# Patient Record
Sex: FEMALE | Race: WHITE | NOT HISPANIC OR LATINO | URBAN - METROPOLITAN AREA
[De-identification: names, ages, dates, MRNs, and addresses within clinical notes are randomized per-mention and may not be internally consistent; named-entity substitution may affect disease eponyms.]

---

## 2018-02-13 ENCOUNTER — APPOINTMENT (RX ONLY)
Dept: URBAN - METROPOLITAN AREA CLINIC 125 | Facility: CLINIC | Age: 73
Setting detail: DERMATOLOGY
End: 2018-02-13

## 2018-02-13 DIAGNOSIS — D22 MELANOCYTIC NEVI: ICD-10-CM

## 2018-02-13 DIAGNOSIS — D18.0 HEMANGIOMA: ICD-10-CM

## 2018-02-13 DIAGNOSIS — L82.1 OTHER SEBORRHEIC KERATOSIS: ICD-10-CM

## 2018-02-13 DIAGNOSIS — L81.4 OTHER MELANIN HYPERPIGMENTATION: ICD-10-CM

## 2018-02-13 DIAGNOSIS — B35.1 TINEA UNGUIUM: ICD-10-CM

## 2018-02-13 DIAGNOSIS — D17 BENIGN LIPOMATOUS NEOPLASM: ICD-10-CM

## 2018-02-13 PROBLEM — D18.01 HEMANGIOMA OF SKIN AND SUBCUTANEOUS TISSUE: Status: ACTIVE | Noted: 2018-02-13

## 2018-02-13 PROBLEM — D17.1 BENIGN LIPOMATOUS NEOPLASM OF SKIN AND SUBCUTANEOUS TISSUE OF TRUNK: Status: ACTIVE | Noted: 2018-02-13

## 2018-02-13 PROBLEM — F41.9 ANXIETY DISORDER, UNSPECIFIED: Status: ACTIVE | Noted: 2018-02-13

## 2018-02-13 PROBLEM — D22.5 MELANOCYTIC NEVI OF TRUNK: Status: ACTIVE | Noted: 2018-02-13

## 2018-02-13 PROBLEM — J30.1 ALLERGIC RHINITIS DUE TO POLLEN: Status: ACTIVE | Noted: 2018-02-13

## 2018-02-13 PROBLEM — F32.9 MAJOR DEPRESSIVE DISORDER, SINGLE EPISODE, UNSPECIFIED: Status: ACTIVE | Noted: 2018-02-13

## 2018-02-13 PROBLEM — I10 ESSENTIAL (PRIMARY) HYPERTENSION: Status: ACTIVE | Noted: 2018-02-13

## 2018-02-13 PROCEDURE — 99203 OFFICE O/P NEW LOW 30 MIN: CPT

## 2018-02-13 PROCEDURE — ? DIAGNOSIS COMMENT

## 2018-02-13 PROCEDURE — ? COUNSELING

## 2018-02-13 ASSESSMENT — LOCATION DETAILED DESCRIPTION DERM
LOCATION DETAILED: RIGHT SUPERIOR UPPER BACK
LOCATION DETAILED: RIGHT MEDIAL SUPERIOR CHEST
LOCATION DETAILED: RIGHT SUPERIOR MEDIAL UPPER BACK
LOCATION DETAILED: RIGHT GREAT TOENAIL
LOCATION DETAILED: RIGHT CENTRAL MALAR CHEEK
LOCATION DETAILED: RIGHT LATERAL ABDOMEN

## 2018-02-13 ASSESSMENT — LOCATION ZONE DERM
LOCATION ZONE: FACE
LOCATION ZONE: TOENAIL
LOCATION ZONE: TRUNK

## 2018-02-13 ASSESSMENT — LOCATION SIMPLE DESCRIPTION DERM
LOCATION SIMPLE: CHEST
LOCATION SIMPLE: ABDOMEN
LOCATION SIMPLE: RIGHT CHEEK
LOCATION SIMPLE: RIGHT GREAT TOE
LOCATION SIMPLE: RIGHT UPPER BACK

## 2018-05-21 NOTE — PATIENT DISCUSSION
(H25.13) Age-related nuclear cataract, bilateral - Assesment : Examination revealed cataract. - Plan : Monitor for changes. Advised patient to call our office with decreased vision or increased symptoms. Updated GLRx give today. RTC in 1 year for Exam, sooner if problems or changes.

## 2018-05-21 NOTE — PATIENT DISCUSSION
(H11.043) Peripheral pterygium, stationary, bilateral - Assesment : Examination revealed Pterygium -Nasal OU. - Plan : Monitor for changes. Advised patient of condition and handout given. Recommended ATs for comfort and wearing sunglasses. Advised Visine is more irritating and recommended Refresh or Systane. Pt to call our office with decreased vision or increased symptoms.

## 2018-10-10 ENCOUNTER — PREPPED CHART (OUTPATIENT)
Dept: URBAN - METROPOLITAN AREA CLINIC 32 | Facility: CLINIC | Age: 73
End: 2018-10-10

## 2018-10-10 ENCOUNTER — IMPORTED ENCOUNTER (OUTPATIENT)
Dept: URBAN - METROPOLITAN AREA CLINIC 43 | Facility: CLINIC | Age: 73
End: 2018-10-10

## 2018-10-10 PROBLEM — H26.491: Noted: 2018-10-10

## 2018-10-10 PROBLEM — H35.361: Noted: 2018-10-10

## 2018-10-10 PROBLEM — H25.12: Noted: 2018-10-10

## 2019-02-19 ENCOUNTER — APPOINTMENT (RX ONLY)
Dept: URBAN - METROPOLITAN AREA CLINIC 125 | Facility: CLINIC | Age: 74
Setting detail: DERMATOLOGY
End: 2019-02-19

## 2019-02-19 DIAGNOSIS — L81.4 OTHER MELANIN HYPERPIGMENTATION: ICD-10-CM

## 2019-02-19 DIAGNOSIS — D22 MELANOCYTIC NEVI: ICD-10-CM

## 2019-02-19 DIAGNOSIS — D18.0 HEMANGIOMA: ICD-10-CM

## 2019-02-19 DIAGNOSIS — L82.1 OTHER SEBORRHEIC KERATOSIS: ICD-10-CM

## 2019-02-19 PROBLEM — D18.01 HEMANGIOMA OF SKIN AND SUBCUTANEOUS TISSUE: Status: ACTIVE | Noted: 2019-02-19

## 2019-02-19 PROBLEM — D22.5 MELANOCYTIC NEVI OF TRUNK: Status: ACTIVE | Noted: 2019-02-19

## 2019-02-19 PROCEDURE — ? COUNSELING

## 2019-02-19 PROCEDURE — 99213 OFFICE O/P EST LOW 20 MIN: CPT

## 2019-02-19 ASSESSMENT — LOCATION DETAILED DESCRIPTION DERM
LOCATION DETAILED: RIGHT LATERAL ABDOMEN
LOCATION DETAILED: RIGHT MEDIAL SUPERIOR CHEST
LOCATION DETAILED: RIGHT CENTRAL MALAR CHEEK
LOCATION DETAILED: RIGHT SUPERIOR MEDIAL UPPER BACK

## 2019-02-19 ASSESSMENT — LOCATION SIMPLE DESCRIPTION DERM
LOCATION SIMPLE: RIGHT UPPER BACK
LOCATION SIMPLE: ABDOMEN
LOCATION SIMPLE: CHEST
LOCATION SIMPLE: RIGHT CHEEK

## 2019-02-19 ASSESSMENT — LOCATION ZONE DERM
LOCATION ZONE: TRUNK
LOCATION ZONE: FACE

## 2019-02-19 NOTE — PROCEDURE: MIPS QUALITY
Detail Level: Detailed
Quality 474: Zoster Vaccination Status: Shingrix vaccination previously received
Quality 131: Pain Assessment And Follow-Up: No documentation of pain assessment, reason not given
Quality 130: Documentation Of Current Medications In The Medical Record: Current Medications Documented

## 2019-09-26 ASSESSMENT — KERATOMETRY
OS_AXISANGLE2_DEGREES: 5
OS_AXISANGLE_DEGREES: 95
OD_K1POWER_DIOPTERS: 48.75
OS_K2POWER_DIOPTERS: 47.5
OD_AXISANGLE_DEGREES: 85
OD_K2POWER_DIOPTERS: 47.75
OD_AXISANGLE2_DEGREES: 175
OS_K1POWER_DIOPTERS: 48.5

## 2019-09-26 ASSESSMENT — VISUAL ACUITY
OS_CC: J1+
OD_SC: 20/25
OS_SC: J2
OD_CC: J1+
OS_SC: 20/25-1
OS_BAT: 20/50-2
OD_SC: J2

## 2019-10-15 ASSESSMENT — TONOMETRY
OS_IOP_MMHG: 11
OD_IOP_MMHG: 10

## 2019-10-16 ENCOUNTER — ESTABLISHED COMPREHENSIVE EXAM (OUTPATIENT)
Dept: URBAN - METROPOLITAN AREA CLINIC 32 | Facility: CLINIC | Age: 74
End: 2019-10-16

## 2019-10-16 DIAGNOSIS — H26.491: ICD-10-CM

## 2019-10-16 DIAGNOSIS — H25.12: ICD-10-CM

## 2019-10-16 DIAGNOSIS — H40.032: ICD-10-CM

## 2019-10-16 PROCEDURE — 92014 COMPRE OPH EXAM EST PT 1/>: CPT

## 2019-10-16 PROCEDURE — 92020 GONIOSCOPY: CPT

## 2019-10-16 ASSESSMENT — KERATOMETRY
OD_AXISANGLE_DEGREES: 75
OD_AXISANGLE2_DEGREES: 175
OS_AXISANGLE_DEGREES: 99
OD_AXISANGLE2_DEGREES: 165
OD_K2POWER_DIOPTERS: 47.25
OD_K1POWER_DIOPTERS: 48.75
OS_K2POWER_DIOPTERS: 47.5
OD_K2POWER_DIOPTERS: 47.75
OS_AXISANGLE_DEGREES: 95
OS_K1POWER_DIOPTERS: 48.5
OD_AXISANGLE_DEGREES: 85
OS_AXISANGLE2_DEGREES: 5
OD_K1POWER_DIOPTERS: 48.25
OS_AXISANGLE2_DEGREES: 9

## 2019-10-16 ASSESSMENT — VISUAL ACUITY
OS_CC: J1+
OD_CC: J1+4
OS_SC: 20/25+1
OD_SC: 20/25+2

## 2019-10-16 ASSESSMENT — TONOMETRY
OS_IOP_MMHG: 10
OD_IOP_MMHG: 11

## 2019-12-16 ENCOUNTER — APPOINTMENT (RX ONLY)
Dept: URBAN - METROPOLITAN AREA CLINIC 124 | Facility: CLINIC | Age: 74
Setting detail: DERMATOLOGY
End: 2019-12-16

## 2019-12-16 DIAGNOSIS — L82.1 OTHER SEBORRHEIC KERATOSIS: ICD-10-CM

## 2019-12-16 DIAGNOSIS — D485 NEOPLASM OF UNCERTAIN BEHAVIOR OF SKIN: ICD-10-CM

## 2019-12-16 PROBLEM — D48.5 NEOPLASM OF UNCERTAIN BEHAVIOR OF SKIN: Status: ACTIVE | Noted: 2019-12-16

## 2019-12-16 PROCEDURE — 99213 OFFICE O/P EST LOW 20 MIN: CPT

## 2019-12-16 PROCEDURE — ? TREATMENT REGIMEN

## 2019-12-16 PROCEDURE — ? COUNSELING

## 2019-12-16 PROCEDURE — ? DEFER

## 2019-12-16 ASSESSMENT — LOCATION SIMPLE DESCRIPTION DERM
LOCATION SIMPLE: RIGHT FOREARM
LOCATION SIMPLE: LEFT FOREARM
LOCATION SIMPLE: LEFT PRETIBIAL REGION
LOCATION SIMPLE: RIGHT PRETIBIAL REGION

## 2019-12-16 ASSESSMENT — LOCATION DETAILED DESCRIPTION DERM
LOCATION DETAILED: LEFT DISTAL DORSAL FOREARM
LOCATION DETAILED: RIGHT PROXIMAL PRETIBIAL REGION
LOCATION DETAILED: LEFT PROXIMAL PRETIBIAL REGION
LOCATION DETAILED: RIGHT PROXIMAL DORSAL FOREARM
LOCATION DETAILED: LEFT PROXIMAL DORSAL FOREARM

## 2019-12-16 ASSESSMENT — LOCATION ZONE DERM
LOCATION ZONE: ARM
LOCATION ZONE: LEG

## 2019-12-16 NOTE — PROCEDURE: DEFER
Procedure To Be Performed At Next Visit: Biopsy by shave method
Introduction Text (Please End With A Colon): Risks reviewed and the patient verbalized understanding.  \\nRequested patient call the clinic when/if they change their mind about
Detail Level: Zone

## 2020-04-19 ASSESSMENT — VISUAL ACUITY
OD_SC: J2
OS_SC: J2
OS_OTHER: 20/50 +2.
OS_CC: J1+
OD_CC: J1+
OS_SC: 20/25 -1
OD_SC: 20/25

## 2020-04-19 ASSESSMENT — KERATOMETRY
OS_K1POWER_DIOPTERS: 48.5
OS_K2POWER_DIOPTERS: 47.5
OD_AXISANGLE_DEGREES: 175
OD_AXISANGLE2_DEGREES: 85
OS_AXISANGLE_DEGREES: 5
OS_AXISANGLE2_DEGREES: 95
OD_K1POWER_DIOPTERS: 48.75
OD_K2POWER_DIOPTERS: 47.75

## 2020-04-19 ASSESSMENT — TONOMETRY
OS_IOP_MMHG: 11.0
OD_IOP_MMHG: 10.0

## 2020-09-14 NOTE — PATIENT DISCUSSION
Monitor. Advised to call immediately with any vision changes, increase in floaters or flashes, or if seeing dark shadows or a curtain/veil across vision.

## 2020-10-13 NOTE — PATIENT DISCUSSION
Monitor. Advised Pt to continue to be alert for changes and to call immediately with any vision changes, increase in floaters or flashes, or if seeing dark shadows or a curtain/veil across vision.